# Patient Record
Sex: FEMALE | Race: WHITE | Employment: OTHER | URBAN - METROPOLITAN AREA
[De-identification: names, ages, dates, MRNs, and addresses within clinical notes are randomized per-mention and may not be internally consistent; named-entity substitution may affect disease eponyms.]

---

## 2024-01-02 ENCOUNTER — HOSPITAL ENCOUNTER (EMERGENCY)
Age: 74
Discharge: HOME | End: 2024-01-03
Attending: EMERGENCY MEDICINE
Payer: COMMERCIAL
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
## 2024-01-02 PROCEDURE — 99284 EMERGENCY DEPT VISIT MOD MDM: CPT

## 2024-01-02 ASSESSMENT — LIFESTYLE VARIABLES
HOW MANY STANDARD DRINKS CONTAINING ALCOHOL DO YOU HAVE ON A TYPICAL DAY: PATIENT DOES NOT DRINK
HOW OFTEN DO YOU HAVE A DRINK CONTAINING ALCOHOL: NEVER

## 2024-01-02 ASSESSMENT — PAIN - FUNCTIONAL ASSESSMENT: PAIN_FUNCTIONAL_ASSESSMENT: 0-10

## 2024-01-02 ASSESSMENT — PAIN SCALES - GENERAL: PAINLEVEL_OUTOF10: 10

## 2024-01-03 ENCOUNTER — APPOINTMENT (OUTPATIENT)
Dept: CT IMAGING | Age: 74
End: 2024-01-03
Payer: COMMERCIAL

## 2024-01-03 VITALS
DIASTOLIC BLOOD PRESSURE: 76 MMHG | OXYGEN SATURATION: 96 % | RESPIRATION RATE: 18 BRPM | HEIGHT: 60 IN | SYSTOLIC BLOOD PRESSURE: 133 MMHG | WEIGHT: 160 LBS | HEART RATE: 74 BPM | BODY MASS INDEX: 31.41 KG/M2 | TEMPERATURE: 98.9 F

## 2024-01-03 PROCEDURE — 72128 CT CHEST SPINE W/O DYE: CPT

## 2024-01-03 PROCEDURE — 70450 CT HEAD/BRAIN W/O DYE: CPT

## 2024-01-03 PROCEDURE — 72131 CT LUMBAR SPINE W/O DYE: CPT

## 2024-01-03 PROCEDURE — 72125 CT NECK SPINE W/O DYE: CPT

## 2024-01-03 NOTE — ED NOTES
Answered call light, found patient in the hallway. Advised patient to wait in bed to prevent a fall. Patient is anxious to get results and leave.

## 2024-01-03 NOTE — ED NOTES
Called [facility] and spoke with Saloni to let her know that pt will be transported back in about 10 minutes.

## 2024-01-03 NOTE — ED PROVIDER NOTES
kg (160 lb)      Height 1.524 m (5')      Head Circumference       Peak Flow       Pain Score       Pain Loc       Pain Edu?       Excl. in GC?        My pulse ox interpretation is - normal    General appearance:  No acute distress.   Skin:  Warm. Dry.   Eye:  Extraocular movements intact.     Ears, nose, mouth and throat:  Oral mucosa moist   Neck:  Trachea midline.   Extremity:  No swelling.  Normal ROM     Heart:  Regular rate and rhythm, normal S1 & S2, no extra heart sounds.    Perfusion:  intact  Respiratory:  Lungs clear to auscultation bilaterally.  Respirations nonlabored.     Abdominal:  Normal bowel sounds.  Soft.  Nontender.  Non distended.  Back:  No CVA tenderness to palpation     Neurological:  Alert and oriented times 3.  No focal neuro deficits.             Psychiatric:  Appropriate    I have reviewed and interpreted all of the currently available lab results from this visit (if applicable):  No results found for this visit on 01/02/24.   Radiographs (if obtained):  Radiologist's Report Reviewed:  No results found.    EKG (if obtained): (All EKG's are interpreted by myself in the absence of a cardiologist)      MDM:  Patient with history of traumatic brain injury lives in assisted living states she uses a walker to get around and she had a mechanical fall when she lost her balance and fell.  She does not recall passing out.  She is complaining some back pain.  She has a thoracolumbar back tenderness without step-offs or erythema or increased warmth to touch or crepitance to suggest infection.  Patient's vital signs stable.  CT head, cervical thoracic and lumbar spines do not reveal acute findings.  Patient is neurologically intact.  Patient was discharged home.    Clinical Impression:  1. Fall from bed, initial encounter      Disposition referral (if applicable):  No follow-up provider specified.  Disposition medications (if applicable):  New Prescriptions    No medications on file     ED Provider

## 2024-09-04 NOTE — DISCHARGE INSTRUCTIONS
Ok for referral.    Worsening headache or loss of vision or weakness in the extremities or chest pain or shortness of breath return to ER